# Patient Record
Sex: FEMALE | Race: WHITE | NOT HISPANIC OR LATINO | URBAN - METROPOLITAN AREA
[De-identification: names, ages, dates, MRNs, and addresses within clinical notes are randomized per-mention and may not be internally consistent; named-entity substitution may affect disease eponyms.]

---

## 2022-05-18 ENCOUNTER — PRE VISIT (OUTPATIENT)
Dept: UROLOGY | Facility: CLINIC | Age: 72
End: 2022-05-18
Payer: COMMERCIAL

## 2022-05-18 NOTE — TELEPHONE ENCOUNTER
MEDICAL RECORDS REQUEST   [facility]  Urology Clinic  [address] [place] [address]  PHONE: [phone]  Fax: [phone]        FUTURE VISIT INFORMATION                                                   Janessa Owusu, : 1950 scheduled for future visit at [facility]    APPOINTMENT INFORMATION:    Date: 06/15/2022    Provider:  Jani Paez MD    Reason for Visit/Diagnosis: Pelvic floor dysfunction      RECORDS REQUESTED FOR VISIT                                                     NOTES  STATUS/DETAILS   OFFICE NOTE from other specialist  yes, 2022 -- Mary Ellen Boykin MD -- [facility]   MEDICATION LIST  yes   LABS     URINALYSIS (UA)  yes, 2022     PRE-VISIT CHECKLIST      Record collection complete Yes   Appointment appropriately scheduled           (right time/right provider) Yes   Joint diagnostic appointment coordinated correctly          (ensure right order & amount of time) Yes   MyChart activation Yes   Questionnaire complete If no, please explain pending

## 2022-05-29 ENCOUNTER — HEALTH MAINTENANCE LETTER (OUTPATIENT)
Age: 72
End: 2022-05-29

## 2022-06-06 ENCOUNTER — PRE VISIT (OUTPATIENT)
Dept: UROLOGY | Facility: CLINIC | Age: 72
End: 2022-06-06
Payer: COMMERCIAL

## 2022-06-06 NOTE — TELEPHONE ENCOUNTER
Reason for Visit: Consult    Diagnosis: Pelvic floor dysfunction    Orders/Procedures/Records: in system    Contact Patient: n/a    Rooming Requirements: UA dip / PVR      Brayden Angel  06/06/22  2:55 PM

## 2022-06-15 ENCOUNTER — OFFICE VISIT (OUTPATIENT)
Dept: UROLOGY | Facility: CLINIC | Age: 72
End: 2022-06-15
Payer: COMMERCIAL

## 2022-06-15 VITALS
DIASTOLIC BLOOD PRESSURE: 77 MMHG | WEIGHT: 130 LBS | BODY MASS INDEX: 19.7 KG/M2 | HEART RATE: 79 BPM | HEIGHT: 68 IN | SYSTOLIC BLOOD PRESSURE: 110 MMHG

## 2022-06-15 DIAGNOSIS — N81.11 MIDLINE CYSTOCELE: Primary | ICD-10-CM

## 2022-06-15 DIAGNOSIS — N81.6 RECTOCELE: ICD-10-CM

## 2022-06-15 DIAGNOSIS — N95.2 ATROPHIC VAGINITIS: ICD-10-CM

## 2022-06-15 PROCEDURE — 99204 OFFICE O/P NEW MOD 45 MIN: CPT | Mod: 25 | Performed by: UROLOGY

## 2022-06-15 PROCEDURE — 51798 US URINE CAPACITY MEASURE: CPT | Performed by: UROLOGY

## 2022-06-15 RX ORDER — SELEGILINE HYDROCHLORIDE 5 MG/1
5 CAPSULE ORAL
COMMUNITY
Start: 2021-08-31

## 2022-06-15 RX ORDER — IBUPROFEN 200 MG
CAPSULE ORAL
COMMUNITY

## 2022-06-15 RX ORDER — ESTRADIOL 0.1 MG/G
1 CREAM VAGINAL
COMMUNITY
Start: 2022-02-07

## 2022-06-15 RX ORDER — CEFAZOLIN SODIUM 2 G/50ML
2 SOLUTION INTRAVENOUS
Status: CANCELLED | OUTPATIENT
Start: 2022-06-15

## 2022-06-15 RX ORDER — OMEGA-3/DHA/EPA/FISH OIL 300-1000MG
2 CAPSULE ORAL
COMMUNITY

## 2022-06-15 RX ORDER — CEFAZOLIN SODIUM 2 G/50ML
2 SOLUTION INTRAVENOUS SEE ADMIN INSTRUCTIONS
Status: CANCELLED | OUTPATIENT
Start: 2022-06-15

## 2022-06-15 RX ORDER — FERROUS SULFATE 325(65) MG
325 TABLET ORAL
COMMUNITY

## 2022-06-15 RX ORDER — TRETINOIN 0.25 MG/G
CREAM TOPICAL
COMMUNITY
Start: 2021-08-31

## 2022-06-15 ASSESSMENT — PAIN SCALES - GENERAL: PAINLEVEL: NO PAIN (0)

## 2022-06-15 NOTE — LETTER
Date:June 16, 2022      Patient was self referred, no letter generated. Do not send.        [facility] Health Information

## 2022-06-15 NOTE — NURSING NOTE
"Chief Complaint   Patient presents with     Consult For     Discuss surgery for cystocele        Blood pressure 110/77, pulse 79, height 1.727 m (5' 8\"), weight 59 kg (130 lb). Body mass index is 19.77 kg/m .    There is no problem list on file for this patient.      No Known Allergies    Current Outpatient Medications   Medication Sig Dispense Refill     calcium carbonate 500 mg, elemental, (OSCAL 500) 1250 (500 Ca) MG TABS tablet 1 tablet with meals       cholecalciferol 25 MCG (1000 UT) TABS Take 1,000 Units by mouth       estradiol (ESTRACE) 0.1 MG/GM vaginal cream Place 1 g vaginally       ferrous sulfate (FEROSUL) 325 (65 Fe) MG tablet Take 325 mg by mouth       fish oil-omega-3 fatty acids 1000 MG capsule Take 2 tablets by mouth       selegiline (ELDEPRYL) 5 MG capsule Take 5 mg by mouth       tretinoin (RETIN-A) 0.025 % external cream Use 1 application in the evening, apply every other night then increase to nightly as tolerated once a day 30 days         Social History     Tobacco Use     Smoking status: Never Smoker     Smokeless tobacco: Never Used       Jennifer Miranda  6/15/2022  10:12 AM  "

## 2022-06-15 NOTE — PROGRESS NOTES
"HPI:  Janessa Owusu is a 71 year old female being evaluated for pelvic organ prolapse.     Main symptoms include heaviness/sensation of prolapse. Has been managing with pessary for past year. Initially with good relief but tries to exercise frequently for history of parkinson's disease and has been becoming increasingly difficult. Urinary symptoms are now mild and worsen with activity, mainly frequency. Uses vaginal estrogen and mybetriq. No reports of  incontinence. Does struggle with constipation - takes miralax and high fiber supplements with improvement in constipation but not much change in prolapse symptoms.    No history of previous /GYN surgery.     Reviewed previous notes from Dr. Boykin at [facility].    Exam:  /77   Pulse 79   Ht 1.727 m (5' 8\")   Wt 59 kg (130 lb)   BMI 19.77 kg/m    General: age-appropriate appearing female in NAD  HEENT: Head AT/NC, EOMI, CN Grossly intact.  Resp: no respiratory distress  CV: heart rate regular  Lymph: No cervical, supraclavicular, inguinal or axillary lymphadenopathy  Back: Bony spine is non-tender, flanks are non-tender.   Abdomen: Healthy weight. Abdomen is soft and nontender. No organomegaly. No surgical scars.  : Grade II cystocele and rectocele present. Healthy appearing vaginal mucosa. No stress leakage appreciated.   LE: Edema is none   Neuro: grossly non focal. Normal reflexes  Skin: clear of rashes or ecchymoses. No sacral decubitus ulcer.  Motor: excellent strength throughout      Assessment & Plan   71F with symptomatic pelvic organ prolapse. Previously managed pessary but now interested in surgery. Discussed different surgical options, she is most interested in robotic assisted sacroculpopexy. No previous abdominal surgeries. Discussed coordination with OB/GYN for hysterectomy portion of procedure.  We discussed the use of mesh in surgery and the risks which include but are not limited to infection, bleeding, exposure of mesh, " vaginal pain, injury to the bladder/urethra/rectum or any structures in the abdomen or pelvis, as well as risk of incontinence or urinary retention. There is also risk of need for catheterization and possible further surgery. Discussed risk of occult stress incontinence that could be unveiled from procedure and possibility of sling placement at end of procedure. All of her questions were answered and she is interested in moving forward with surgery planning.  -schedule robotic sacralcolpopexy  -pt. To see Dr. Kymberly varner for a hysterectomy consult.    Jani Paez MD  [facility] [place]    Patient was seen, evaluated and plan was formulated in conjunction with me and I agree with the above.  Jani Paez MD    ==========================      Additional Coding Information:    Time spent:  46 minutes spent on the date of the encounter doing chart review, history and exam, documentation and further activities per the note

## 2022-06-15 NOTE — LETTER
"6/15/2022       RE: Janessa Owusu  [address] [facility] [address]     Dear Colleague,    Thank you for referring your patient, Janessa Owusu, to the [facility] [place] at [facility]. Please see a copy of my visit note below.    HPI:  Janessa Owusu is a 71 year old female being evaluated for pelvic organ prolapse.     Main symptoms include heaviness/sensation of prolapse. Has been managing with pessary for past year. Initially with good relief but tries to exercise frequently for history of parkinson's disease and has been becoming increasingly difficult. Urinary symptoms are now mild and worsen with activity, mainly frequency. Uses vaginal estrogen and mybetriq. No reports of  incontinence. Does struggle with constipation - takes miralax and high fiber supplements with improvement in constipation but not much change in prolapse symptoms.    No history of previous /GYN surgery.     Reviewed previous notes from Dr. Boykin at [facility].    Exam:  /77   Pulse 79   Ht 1.727 m (5' 8\")   Wt 59 kg (130 lb)   BMI 19.77 kg/m    General: age-appropriate appearing female in NAD  HEENT: Head AT/NC, EOMI, CN Grossly intact.  Resp: no respiratory distress  CV: heart rate regular  Lymph: No cervical, supraclavicular, inguinal or axillary lymphadenopathy  Back: Bony spine is non-tender, flanks are non-tender.   Abdomen: Healthy weight. Abdomen is soft and nontender. No organomegaly. No surgical scars.  : Grade II cystocele and rectocele present. Healthy appearing vaginal mucosa. No stress leakage appreciated.   LE: Edema is none   Neuro: grossly non focal. Normal reflexes  Skin: clear of rashes or ecchymoses. No sacral decubitus ulcer.  Motor: excellent strength throughout      Assessment & Plan   71F with symptomatic pelvic organ prolapse. Previously managed pessary but now interested in surgery. Discussed different surgical " options, she is most interested in robotic assisted sacroculpopexy. No previous abdominal surgeries. Discussed coordination with OB/GYN for hysterectomy portion of procedure.  We discussed the use of mesh in surgery and the risks which include but are not limited to infection, bleeding, exposure of mesh, vaginal pain, injury to the bladder/urethra/rectum or any structures in the abdomen or pelvis, as well as risk of incontinence or urinary retention. There is also risk of need for catheterization and possible further surgery. Discussed risk of occult stress incontinence that could be unveiled from procedure and possibility of sling placement at end of procedure. All of her questions were answered and she is interested in moving forward with surgery planning.  -schedule robotic sacralcolpopexy  -pt. To see Dr. Kymberly varner for a hysterectomy consult.    Jani Paez MD  [facility] [place]    Patient was seen, evaluated and plan was formulated in conjunction with me and I agree with the above.  Jani Paez MD    ==========================      Additional Coding Information:    Time spent:  46 minutes spent on the date of the encounter doing chart review, history and exam, documentation and further activities per the note              Again, thank you for allowing me to participate in the care of your patient.      Sincerely,    Jani Paez MD

## 2022-06-20 ENCOUNTER — TELEPHONE (OUTPATIENT)
Dept: OBGYN | Facility: CLINIC | Age: 72
End: 2022-06-20
Payer: COMMERCIAL

## 2022-06-20 NOTE — TELEPHONE ENCOUNTER
----- Message from Kimmy Traylor MD sent at 6/17/2022  5:29 PM CDT -----  Regarding: FW: another joint case  Can you see if there is anything available for this patient, possibly during DOD or admin time?  Thanks Kimmy  ----- Message -----  From: Jani Paez MD  Sent: 6/15/2022  10:58 AM CDT  To: Santos Deal, Kimmy Traylor MD  Subject: another joint case                               Hi Kimmy,   This patient would love to see you for a consult and get a hysterectomy scheduled.  She of course would like to schedule this asap.

## 2022-06-20 NOTE — CONFIDENTIAL NOTE
Tried to reach Janessa,  but received voicemail.  Left message to call back to confirm if she is available for phone visit on 6/23/22 at 4 PM.  This is on Dr Traylor's google calendar, but will need to be scheduled in Epic if pt is available.  Offered either in person or phond

## 2022-06-21 ENCOUNTER — TELEPHONE (OUTPATIENT)
Dept: OBGYN | Facility: CLINIC | Age: 72
End: 2022-06-21

## 2022-06-21 NOTE — TELEPHONE ENCOUNTER
Left a voicemail with patient regarding consult visit with Dr. Traylor on 8/19.      There is appointment availability for this patient on 8/11 if the patient would be interested in moving the appointment to a sooner date.     Please notify the [facility]  if the patient would like to take the appointment on 8/11.     - Carmella Alegre   Clinic Services Assistant

## 2022-06-21 NOTE — TELEPHONE ENCOUNTER
[facility] Call Center    Phone Message    May a detailed message be left on voicemail: yes     Reason for Call: Other: Janessa is calling back stating the time on 8/11 will not work and just wanted to let Carmella know, thanks!     Action Taken: Other: whs    Travel Screening: Not Applicable

## 2022-06-23 ENCOUNTER — TELEPHONE (OUTPATIENT)
Dept: OBGYN | Facility: CLINIC | Age: 72
End: 2022-06-23

## 2022-06-23 NOTE — TELEPHONE ENCOUNTER
Patient left a message needing to cancel her appointment for today at 1600 with Dr. Traylor.    Left message for patient stating her appointment for today is cancelled and she is scheduled for August 19th at 9:00 am with Dr. Traylor for a consult for a hysterectomy.    Left our clinic number if patient would like to call and reschedule [phone]

## 2022-06-28 NOTE — TELEPHONE ENCOUNTER
Spoke with patient regarding scheduling the consult visit for sooner than 8/19 per Dr. Traylor. The patient stated they have other things going on and would prefer to keep the 8/19 appointment. The patient stated they would call back if things change and they would like the sooner date.     - Carmella   [facility] Services Assistant

## 2022-10-03 ENCOUNTER — HEALTH MAINTENANCE LETTER (OUTPATIENT)
Age: 72
End: 2022-10-03

## 2023-02-12 ENCOUNTER — HEALTH MAINTENANCE LETTER (OUTPATIENT)
Age: 73
End: 2023-02-12

## 2024-03-10 ENCOUNTER — HEALTH MAINTENANCE LETTER (OUTPATIENT)
Age: 74
End: 2024-03-10

## 2024-05-19 ENCOUNTER — HEALTH MAINTENANCE LETTER (OUTPATIENT)
Age: 74
End: 2024-05-19

## 2025-03-16 ENCOUNTER — HEALTH MAINTENANCE LETTER (OUTPATIENT)
Age: 75
End: 2025-03-16